# Patient Record
Sex: FEMALE | Race: WHITE | NOT HISPANIC OR LATINO | Employment: FULL TIME | URBAN - METROPOLITAN AREA
[De-identification: names, ages, dates, MRNs, and addresses within clinical notes are randomized per-mention and may not be internally consistent; named-entity substitution may affect disease eponyms.]

---

## 2022-07-02 ENCOUNTER — HOSPITAL ENCOUNTER (EMERGENCY)
Facility: HOSPITAL | Age: 27
Discharge: HOME | End: 2022-07-02
Attending: EMERGENCY MEDICINE | Admitting: EMERGENCY MEDICINE

## 2022-07-02 ENCOUNTER — APPOINTMENT (OUTPATIENT)
Dept: CT IMAGING | Facility: HOSPITAL | Age: 27
End: 2022-07-02

## 2022-07-02 VITALS
WEIGHT: 200 LBS | BODY MASS INDEX: 35.44 KG/M2 | OXYGEN SATURATION: 99 % | HEIGHT: 63 IN | DIASTOLIC BLOOD PRESSURE: 93 MMHG | TEMPERATURE: 97.5 F | RESPIRATION RATE: 18 BRPM | SYSTOLIC BLOOD PRESSURE: 134 MMHG | HEART RATE: 83 BPM

## 2022-07-02 DIAGNOSIS — M54.50 ACUTE RIGHT-SIDED LOW BACK PAIN WITHOUT SCIATICA: Primary | ICD-10-CM

## 2022-07-02 LAB
B-HCG UR QL: NEGATIVE
BILIRUB UR QL STRIP: NEGATIVE
CLARITY UR: CLEAR
COLOR UR: YELLOW
GLUCOSE UR STRIP-MCNC: NEGATIVE MG/DL
HGB UR QL STRIP.AUTO: NEGATIVE
KETONES UR QL STRIP: NEGATIVE
LEUKOCYTE ESTERASE UR QL STRIP.AUTO: NEGATIVE
NITRITE UR QL STRIP: NEGATIVE
PH UR STRIP.AUTO: 7.5 [PH] (ref 5–8)
PROT UR QL STRIP: NEGATIVE
SP GR UR STRIP: 1.01 (ref 1–1.03)
UROBILINOGEN UR QL STRIP: NORMAL

## 2022-07-02 PROCEDURE — 99283 EMERGENCY DEPT VISIT LOW MDM: CPT

## 2022-07-02 PROCEDURE — 81003 URINALYSIS AUTO W/O SCOPE: CPT | Performed by: EMERGENCY MEDICINE

## 2022-07-02 PROCEDURE — 81025 URINE PREGNANCY TEST: CPT | Performed by: EMERGENCY MEDICINE

## 2022-07-02 PROCEDURE — 74176 CT ABD & PELVIS W/O CONTRAST: CPT

## 2022-07-02 RX ORDER — METHYLPREDNISOLONE 4 MG/1
TABLET ORAL
Qty: 21 TABLET | Refills: 0 | Status: SHIPPED | OUTPATIENT
Start: 2022-07-02

## 2022-07-02 RX ORDER — IBUPROFEN 800 MG/1
800 TABLET ORAL ONCE
Status: COMPLETED | OUTPATIENT
Start: 2022-07-02 | End: 2022-07-02

## 2022-07-02 RX ADMIN — IBUPROFEN 800 MG: 800 TABLET, FILM COATED ORAL at 18:50

## 2022-07-02 NOTE — ED TRIAGE NOTES
.Pt masked on arrival, staff masked    Pt reports back pain for 6 weeks, worse today, denies injury, ambulatory w/steady gait

## 2022-07-03 NOTE — ED PROVIDER NOTES
EMERGENCY DEPARTMENT ENCOUNTER    Room Number:  A03/03  Date seen:  7/2/2022  PCP: Provider, No Known  Historian: Patient      HPI:  Chief Complaint: Right back pain  A complete HPI/ROS/PMH/PSH/SH/FH are unobtainable due to: Nothing  Context: Padmini Reeves is a 27 y.o. female who presents to the ED c/o right low back pain that has been ongoing for about 6 weeks, since returning from her honeymoon.  They went to [place] for their honeymoon.  She reports the pain is localized to the right lower back and wraps around and radiates to the right groin at times.  She has had no dysuria or hematuria.  She reports it is a burning pain that is deep.  She has had some relief by bending over at the bedside.  She denies fever or chills.  She has had no rash.  She denies any bowel or bladder incontinence.  No history of IV drug use.  No numbness, tingling, weakness in her lower extremities.  She is concerned for possible kidney stone.  She denies history of kidney stones.  She is not sure if she may be pregnant.  She has been trying to get pregnant.            PAST MEDICAL HISTORY  Active Ambulatory Problems     Diagnosis Date Noted   • No Active Ambulatory Problems     Resolved Ambulatory Problems     Diagnosis Date Noted   • No Resolved Ambulatory Problems     No Additional Past Medical History         PAST SURGICAL HISTORY  No past surgical history on file.      FAMILY HISTORY  No family history on file.      SOCIAL HISTORY  Social History     Socioeconomic History   • Marital status:          ALLERGIES  Patient has no known allergies.        REVIEW OF SYSTEMS  Review of Systems   Review of all 14 systems is negative other than stated in the HPI above.      PHYSICAL EXAM  ED Triage Vitals   Temp Heart Rate Resp BP SpO2   07/02/22 1412 07/02/22 1412 07/02/22 1412 07/02/22 1707 07/02/22 1412   97.5 °F (36.4 °C) 90 18 131/88 99 %      Temp src Heart Rate Source Patient Position BP Location FiO2 (%)   -- -- -- -- --                 GENERAL: Awake and alert, no acute distress  HENT: nares patent  EYES: no scleral icterus, EOMI  CV: regular rhythm, normal rate  RESPIRATORY: normal effort  ABDOMEN: soft, nondistended, nontender throughout  MUSCULOSKELETAL: no deformity, no midline T or L-spine tenderness.  There is not really point tenderness present over the right lumbar paraspinous region  NEURO: alert, moves all extremities, follows commands, normal sensation light touch throughout bilateral lower extremities, normal steady gait  PSYCH:  calm, cooperative  SKIN: warm, dry, no rash    Vital signs and nursing notes reviewed.          LAB RESULTS  Recent Results (from the past 24 hour(s))   Pregnancy, Urine - Urine, Clean Catch    Collection Time: 07/02/22  5:02 PM    Specimen: Urine, Clean Catch   Result Value Ref Range    HCG, Urine QL Negative Negative   Urinalysis With Microscopic If Indicated (No Culture) - Urine, Clean Catch    Collection Time: 07/02/22  5:02 PM    Specimen: Urine, Clean Catch   Result Value Ref Range    Color, UA Yellow Yellow, Straw    Appearance, UA Clear Clear    pH, UA 7.5 5.0 - 8.0    Specific Gravity, UA 1.006 1.005 - 1.030    Glucose, UA Negative Negative    Ketones, UA Negative Negative    Bilirubin, UA Negative Negative    Blood, UA Negative Negative    Protein, UA Negative Negative    Leuk Esterase, UA Negative Negative    Nitrite, UA Negative Negative    Urobilinogen, UA 0.2 E.U./dL 0.2 - 1.0 E.U./dL       Ordered the above labs and reviewed the results.        RADIOLOGY  CT Abdomen Pelvis Without Contrast    Result Date: 7/2/2022  Patient: CAROLA GOTTI  Time Out: 19:47 Exam(s): CT ABDOMEN + PELVIS Without Contrast EXAM:   CT Abdomen and Pelvis Without Intravenous Contrast CLINICAL HISTORY:    Reason for exam: right low back pain x 6 weeks, ureteral stone vs lumbar radiculopathy. TECHNIQUE:   Axial computed tomography images of the abdomen and pelvis without intravenous contrast.  CTDI is 15.4 mGy  and DLP is 813 mGy-cm.  This CT exam was performed according to the principle of ALARA (As Low As Reasonably Achievable) by using one or more of the following dose reduction techniques: automated exposure control, adjustment of the mA and or kV according to patient size, and or use of iterative reconstruction technique. COMPARISON:   No relevant prior studies available. FINDINGS:   Lung bases:  Unremarkable.  ABDOMEN:   Liver:  Hepatic steatosis.   Gallbladder and bile ducts:  Unremarkable.   Pancreas:  Unremarkable.   Spleen:  Unremarkable.   Adrenals:  Unremarkable.   Kidneys and ureters:  Question 4 mm calculus in the left renal collecting system proximal ureter ( series 2 image 57).  Mild wall thickening of both ureters with stranding.  Question a nonspecific urethritis.  Correlate with urinalysis to exclude infection.   Stomach and bowel:  Unremarkable.  PELVIS:   Appendix:  Appendix is unremarkable.   Bladder:  Unremarkable.   Reproductive:  Unremarkable as visualized.  ABDOMEN and PELVIS:   Intraperitoneal space:  Unremarkable.   Bones joints:  No acute fracture.  No dislocation.   Soft tissues:  Unremarkable.   Vasculature:  Punctate calcification within the right hemipelvis likely phlebolith.   Lymph nodes:  Unremarkable. IMPRESSION:     1.  Question 4 mm calculus in the left renal collecting system proximal ureter ( series 2 image 57). 2.  Mild wall thickening of both ureters with stranding.  Question a nonspecific urethritis.  Correlate with urinalysis to exclude infection.     Electronically signed by Wayne Turner MD on 07-02-22 at 1947      Ordered the above noted radiological studies. Reviewed by me in PACS.            PROCEDURES  Procedures              MEDICATIONS GIVEN IN ER  Medications   ibuprofen (ADVIL,MOTRIN) tablet 800 mg (800 mg Oral Given 7/2/22 1850)                   MEDICAL DECISION MAKING, PROGRESS, and CONSULTS    All labs have been independently reviewed by me.  All radiology  studies have been reviewed by me and discussed with radiologist dictating the report.   EKG's independently viewed and interpreted by me.  Discussion below represents my analysis of pertinent findings related to patient's condition, differential diagnosis, treatment plan and final disposition.      Differential diagnosis includes but is not limited to:  Lumbar radiculopathy  Ureteral calculus  Pyelonephritis  Herpes zoster  Muscle strain      ED Course as of 07/02/22 2338   Sat Jul 02, 2022   1741 HCG, Urine QL: Negative [JR]   1741 Blood, UA: Negative [JR]   1748 Patient reassessed and informed of negative pregnancy test and normal urinalysis.  I have ordered ibuprofen for her back pain.  I discussed plan to obtain a CT abdomen. [JR]      ED Course User Index  [JR] Gagan Garcia MD     I personally reviewed the CT abdomen pelvis.  Think she has some mild fullness of the ureters bilaterally I do not see evidence of obstructing ureteral calculus.  Radiologist did mention a possible calculus on the left however her symptoms are on the right.  Urinalysis is without evidence of UTI or hematuria.  Urine pregnancy is negative.  I think her pain is radicular in most likely from her low back.  She does not have any neurologic symptoms that would warrant emergent MRI.  I discussed plan to trial a course of oral steroids and follow-up with her PCP.  Return precautions were discussed.         I wore an N95 mask, face shield, and gloves during this patient encounter.  Patient also wearing a surgical mask.  Hand hygeine performed before and after seeing the patient.    DIAGNOSIS  Final diagnoses:   Acute right-sided low back pain without sciatica         DISPOSITION  DISCHARGE    Patient discharged in stable condition.    Reviewed implications of results, diagnosis, meds, responsibility to follow up, warning signs and symptoms of possible worsening, potential complications and reasons to return to ER.    Patient/Family  voiced understanding of above instructions.    Discussed plan for discharge, as there is no emergent indication for admission. Patient referred to primary care provider for BP management due to today's BP. Pt/family is agreeable and understands need for follow up and repeat testing.  Pt is aware that discharge does not mean that nothing is wrong but it indicates no emergency is present that requires admission and they must continue care with follow-up as given below or physician of their choice.     FOLLOW-UP  PATIENT CONNECTION - [place] [address]  [phone]  Call in 1 day           Medication List      New Prescriptions    methylPREDNISolone 4 MG dose pack  Commonly known as: MEDROL  Take as directed on package instructions.           Where to Get Your Medications      These medications were sent to [address] - [address] [facility] - [phone]  - [phone] [address] [address]    Phone: [phone]   · methylPREDNISolone 4 MG dose pack                   Latest Documented Vital Signs:  As of 23:38 EDT  BP- 134/93 HR- 83 Temp- 97.5 °F (36.4 °C) O2 sat- 99%        --    Please note that portions of this were completed with a voice recognition program.            Gagan Garcia MD  07/02/22 9334